# Patient Record
Sex: FEMALE | Race: WHITE | Employment: UNEMPLOYED | URBAN - METROPOLITAN AREA
[De-identification: names, ages, dates, MRNs, and addresses within clinical notes are randomized per-mention and may not be internally consistent; named-entity substitution may affect disease eponyms.]

---

## 2017-04-18 PROBLEM — E04.1 THYROID NODULE: Status: ACTIVE | Noted: 2017-04-18

## 2023-08-12 ENCOUNTER — HOSPITAL ENCOUNTER (EMERGENCY)
Facility: HOSPITAL | Age: 63
Discharge: HOME | End: 2023-08-12
Attending: EMERGENCY MEDICINE
Payer: COMMERCIAL

## 2023-08-12 ENCOUNTER — APPOINTMENT (OUTPATIENT)
Dept: ULTRASOUND IMAGING | Facility: HOSPITAL | Age: 63
End: 2023-08-12
Payer: COMMERCIAL

## 2023-08-12 VITALS
SYSTOLIC BLOOD PRESSURE: 145 MMHG | OXYGEN SATURATION: 97 % | DIASTOLIC BLOOD PRESSURE: 89 MMHG | HEART RATE: 70 BPM | RESPIRATION RATE: 14 BRPM | WEIGHT: 156 LBS | TEMPERATURE: 97 F

## 2023-08-12 DIAGNOSIS — R60.9 PERIPHERAL EDEMA: Primary | ICD-10-CM

## 2023-08-12 PROCEDURE — 99284 EMERGENCY DEPT VISIT MOD MDM: CPT

## 2023-08-12 PROCEDURE — 93971 EXTREMITY STUDY: CPT | Performed by: EMERGENCY MEDICINE

## 2023-08-12 NOTE — ED QUICK NOTES
Pt to triage desk inquiring why other patients were taking back before her. Explained to patient that rooming does not occur by first arrival but by acuity. Pt verbalizes understanding.

## 2023-09-19 PROBLEM — Z86.0100 PERSONAL HISTORY OF COLONIC POLYPS: Status: ACTIVE | Noted: 2023-09-19

## 2023-09-19 PROBLEM — D12.3 BENIGN NEOPLASM OF TRANSVERSE COLON: Status: ACTIVE | Noted: 2023-09-19

## 2024-10-02 ENCOUNTER — OFFICE VISIT (OUTPATIENT)
Dept: SURGERY | Facility: CLINIC | Age: 64
End: 2024-10-02
Payer: MEDICARE

## 2024-10-02 DIAGNOSIS — C44.529 SQUAMOUS CELL CARCINOMA OF SKIN OF CHEST: Primary | ICD-10-CM

## 2024-10-02 PROCEDURE — 99203 OFFICE O/P NEW LOW 30 MIN: CPT | Performed by: SURGERY

## 2024-10-02 NOTE — PATIENT INSTRUCTIONS
Surgeon:              Dr. Carol Aponte, PhD                                          Tel:         [phone]                                  Fax:        [phone]      Surgery/Procedure: Excision of squamous cell carcinoma mid chest left of center with intraoperative frozen sections, wound closure with local tissue rearrangement, possible grafts  1.5 hours, local anesthesia, outpatient, not October 23-27, 2024    Dx Code: [C44.529]     Hospital:  [facility]: [address] [place] [address]           [phone]  [facility]: [address], [place] [address]               [phone]    1. Someone will need to drive you to and from the hospital if your procedure is outpatient.    2.Do not drink alcohol or smoke 24 hours prior to your procedure.    3. Bring a picture ID and your insurance card.    4. You will be contacted by the hospital the day before to confirm the procedure time and location.     5. Do not take any herbal supplements or blood thinners at least one week before your procedure/surgery. This includes NSAID's (aspirin, baby aspirin, Motrin, Ibuprofen, Aleve, Advil, Naproxen, etc), fish oil, vitamin E, turmeric, CoQ10, or green tea supplements, etc. *TYLENOL or acetaminophen is ok to take*    6. Please inform us if you start or change any medications at least one week before surgery (ex: blood thinners, weight loss medications, diabetic medications, herbal supplements, etc)    8. Does patient have diagnosis of sleep apnea?    [   ] Yes     [  X ]  No    Consent obtained   Photos taken on 10/2/2024

## 2024-10-02 NOTE — CONSULTS
New Patient Consultation    Chief Complaint: squamous cell carcinoma mid chest     History of Present Illness:   Alisha Hillman is a 64 year old female referred by Dr. 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 This was diagnosed via shave biopsy on 9/10/2024.  She is here to discuss options for excision and reconstruction.     Past Medical History:      Past Medical History:    Anxiety    Arthritis    Constipation    Leaking of urine    Pain in joints    Thyroid disease         Past Surgical History:  Past Surgical History:   Procedure Laterality Date    Appendectomy           Medications:    No outpatient medications have been marked as taking for the 10/2/24 encounter (Appointment) with Carol Aponte MD.         Allergies:    Allergies   Allergen Reactions    Aspirin ANGIOEDEMA    Excedrin Extra Strength TONGUE SWELLING    Ibuprofen HIVES     Tongue Swelling    Sulfa Antibiotics      Patient unable to recall reaction.    Flagyl [Metronidazole] RASH         Family History:   Family History   Problem Relation Age of Onset    Ear Problems Father     Allergies Mother     Cancer Maternal Grandmother     Cancer Maternal Aunt     Bleeding Disorders Neg     Clotting Disorder Neg     Thyroid disease Neg     Anesthesia Problems  Neg          Social History:  History   Alcohol Use    7.0 standard drinks of alcohol/week    5 Glasses of wine, 2 Shots of liquor per week     Comment: \"social\"       History   Smoking Status    Never   Smokeless Tobacco    Never       History   Drug Use No       Review of Systems:    A 13 point review of systems was performed on the intake sheet.  The patient reports   General:   The patient denies, fever, chills, night sweats, fatigue, generalized weakness, change in appetite, weight loss, or +weight gain.  Endocrine:   There is no history of poor/slow wound healing, +weight loss/gain,  fertility or hormone problems, cold intolerance, heat intolerance, excessive thirst, or +thyroid disease.   Allergic/Immunologic:  There is no history of hives, hay fever, angioedema or anaphylaxis.  HEENT:    The patient denies ear pain, ear drainage, hearing loss, change in vision, double vision, cataracts, glaucoma, nasal congestion, nosebleed, hoarseness, sore throat, or swollen glands  Respiratory:   The patient denies shortness of breath, cough, bloody cough, phlegm, asthma, or wheezing  Cardiovascular:  The patient denies chest pain/pressure, palpitations, irregular heartbeat, high blood pressure, stroke, or leg swelling  Breasts:  Patient denies breast masses, pain, change in the breast skin, skin dimpling, nipple discharge, or rash  Gastrointestinal:   There is no history of difficulty or pain with swallowing, reflux symptoms, nausea, vomiting, dark/ bloody stools, diarrhea, +constipation,  change in bowel habits, or abdominal pain.   Genitourinary:  The patient denies frequent urination, +needing to get up at night to urinate, urinary hesitancy or retaining urine, painful urination, urinary incontinence, decreased urine stream, blood in the urine, or vaginal/penile discharge.  Skin:   The patient denies rash, itching, skin lesions, dry skin, change in skin color or change in moles, sunburns, or sunburns with blistering.   Hematologic/Lymphatic:  The patient denies easily bruising or bleeding, persistent swollen glands or lymph nodes, bleeding disorders, blood clots, or pulmonary embolism.   Gynecologic:  The patient denies irregular menses, pelvic pain, pain with intercourse, painful menses, or pregnancy  Musculoskeletal:  The patient denies +muscle aches/pain, +joint pain, +stiff joints, neck pain, back pain or bone pain.  Neurologic:  There is no history of migraines or severe headaches, seizure/epilepsy, speech problems, coordination problems, trembling/tremors, fainting/black outs, dizziness, memory  problems, loss of sensation/numbness, problems walking, weakness, +tingling or burning in hands/feet.   Psychiatric:  There is no history of abusive relationship, bipolar disorder, sleep disturbance, anxiety, depression or feeling of despair.    Physical Exam:    There were no vitals taken for this visit.    Constitutional: The patient is an alert, oriented and well-developed.     Neurologic: Speech patterns and movements are normal.     Psychiatric: Affect is appropriate.    Eyes: Conjunctiva are clear, non-icteric. PERRL    ENT: no obvious abnormality, no ear drainage, mucous membranes moist and pink    Integument/Skin: left mid chest with well healed biopsy site    Respiratory: Normal respiratory effort.     Cardiovascular: no cyanosis, no edema    Musculoskeletal: Extremities unremarkable, without edema, tenderness or deformities    Impression:   Alisha Hillman  is a 64 year old with focally invasive well differentiated squamous cell carcinoma mid chest left of [place]    Discussion and Plan:  The patient was counseled on the different treatment options.     We discussed the option for excision of squamous cell carcinoma mid chest left [facility] with intraoperative frozen sections, wound closure with local tissue rearrangement, possible grafts. We discussed that the size of the defect will be determined following excision with intraoperative frozen sections. This will be done under local anesthesia. We discussed the possible need for further treatment pending final pathology.     The different treatment options were discussed with the patient.  The procedures and the postoperative care was discussed in detail.  Potential risks complications benefits and alternatives were discussed.  Risks and complications including but not limited to infection, bleeding, scarring, damage to surrounding structures, such as nerves, blood vessels, muscles,  tendons, risk of hematoma, seroma, foreign body reaction, allergic  reaction, wound dehiscences, delayed wound healing, graft failure, flap failure, need for further surgery.    Patient understands and wishes to proceed with the procedure, questions were answered.    45 minutes were spent with the patient, from which 35 minutes were spent counseling the patient and coordinating care.

## 2024-10-02 NOTE — H&P (VIEW-ONLY)
New Patient Consultation    Chief Complaint: squamous cell carcinoma mid chest     History of Present Illness:   Alisha Hillman is a 64 year old female referred by Dr. 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 This was diagnosed via shave biopsy on 9/10/2024.  She is here to discuss options for excision and reconstruction.     Past Medical History:      Past Medical History:    Anxiety    Arthritis    Constipation    Leaking of urine    Pain in joints    Thyroid disease         Past Surgical History:  Past Surgical History:   Procedure Laterality Date    Appendectomy           Medications:    No outpatient medications have been marked as taking for the 10/2/24 encounter (Appointment) with Carol Aponte MD.         Allergies:    Allergies   Allergen Reactions    Aspirin ANGIOEDEMA    Excedrin Extra Strength TONGUE SWELLING    Ibuprofen HIVES     Tongue Swelling    Sulfa Antibiotics      Patient unable to recall reaction.    Flagyl [Metronidazole] RASH         Family History:   Family History   Problem Relation Age of Onset    Ear Problems Father     Allergies Mother     Cancer Maternal Grandmother     Cancer Maternal Aunt     Bleeding Disorders Neg     Clotting Disorder Neg     Thyroid disease Neg     Anesthesia Problems  Neg          Social History:  History   Alcohol Use    7.0 standard drinks of alcohol/week    5 Glasses of wine, 2 Shots of liquor per week     Comment: \"social\"       History   Smoking Status    Never   Smokeless Tobacco    Never       History   Drug Use No       Review of Systems:    A 13 point review of systems was performed on the intake sheet.  The patient reports   General:   The patient denies, fever, chills, night sweats, fatigue, generalized weakness, change in appetite, weight loss, or +weight gain.  Endocrine:   There is no history of poor/slow wound healing, +weight loss/gain,  fertility or hormone problems, cold intolerance, heat intolerance, excessive thirst, or +thyroid disease.   Allergic/Immunologic:  There is no history of hives, hay fever, angioedema or anaphylaxis.  HEENT:    The patient denies ear pain, ear drainage, hearing loss, change in vision, double vision, cataracts, glaucoma, nasal congestion, nosebleed, hoarseness, sore throat, or swollen glands  Respiratory:   The patient denies shortness of breath, cough, bloody cough, phlegm, asthma, or wheezing  Cardiovascular:  The patient denies chest pain/pressure, palpitations, irregular heartbeat, high blood pressure, stroke, or leg swelling  Breasts:  Patient denies breast masses, pain, change in the breast skin, skin dimpling, nipple discharge, or rash  Gastrointestinal:   There is no history of difficulty or pain with swallowing, reflux symptoms, nausea, vomiting, dark/ bloody stools, diarrhea, +constipation,  change in bowel habits, or abdominal pain.   Genitourinary:  The patient denies frequent urination, +needing to get up at night to urinate, urinary hesitancy or retaining urine, painful urination, urinary incontinence, decreased urine stream, blood in the urine, or vaginal/penile discharge.  Skin:   The patient denies rash, itching, skin lesions, dry skin, change in skin color or change in moles, sunburns, or sunburns with blistering.   Hematologic/Lymphatic:  The patient denies easily bruising or bleeding, persistent swollen glands or lymph nodes, bleeding disorders, blood clots, or pulmonary embolism.   Gynecologic:  The patient denies irregular menses, pelvic pain, pain with intercourse, painful menses, or pregnancy  Musculoskeletal:  The patient denies +muscle aches/pain, +joint pain, +stiff joints, neck pain, back pain or bone pain.  Neurologic:  There is no history of migraines or severe headaches, seizure/epilepsy, speech problems, coordination problems, trembling/tremors, fainting/black outs, dizziness, memory  problems, loss of sensation/numbness, problems walking, weakness, +tingling or burning in hands/feet.   Psychiatric:  There is no history of abusive relationship, bipolar disorder, sleep disturbance, anxiety, depression or feeling of despair.    Physical Exam:    There were no vitals taken for this visit.    Constitutional: The patient is an alert, oriented and well-developed.     Neurologic: Speech patterns and movements are normal.     Psychiatric: Affect is appropriate.    Eyes: Conjunctiva are clear, non-icteric. PERRL    ENT: no obvious abnormality, no ear drainage, mucous membranes moist and pink    Integument/Skin: left mid chest with well healed biopsy site    Respiratory: Normal respiratory effort.     Cardiovascular: no cyanosis, no edema    Musculoskeletal: Extremities unremarkable, without edema, tenderness or deformities    Impression:   Alisha Hillman  is a 64 year old with focally invasive well differentiated squamous cell carcinoma mid chest left of [place]    Discussion and Plan:  The patient was counseled on the different treatment options.     We discussed the option for excision of squamous cell carcinoma mid chest left [facility] with intraoperative frozen sections, wound closure with local tissue rearrangement, possible grafts. We discussed that the size of the defect will be determined following excision with intraoperative frozen sections. This will be done under local anesthesia. We discussed the possible need for further treatment pending final pathology.     The different treatment options were discussed with the patient.  The procedures and the postoperative care was discussed in detail.  Potential risks complications benefits and alternatives were discussed.  Risks and complications including but not limited to infection, bleeding, scarring, damage to surrounding structures, such as nerves, blood vessels, muscles,  tendons, risk of hematoma, seroma, foreign body reaction, allergic  reaction, wound dehiscences, delayed wound healing, graft failure, flap failure, need for further surgery.    Patient understands and wishes to proceed with the procedure, questions were answered.    45 minutes were spent with the patient, from which 35 minutes were spent counseling the patient and coordinating care.

## 2024-10-03 ENCOUNTER — TELEPHONE (OUTPATIENT)
Dept: SURGERY | Facility: CLINIC | Age: 64
End: 2024-10-03

## 2024-10-03 NOTE — TELEPHONE ENCOUNTER
AFSHAN for patient asking them to please give me a call back regarding scheduling their surgery with Dr. Aponte

## 2024-10-04 ENCOUNTER — TELEPHONE (OUTPATIENT)
Dept: SURGERY | Facility: CLINIC | Age: 64
End: 2024-10-04

## 2024-10-04 DIAGNOSIS — C44.529 SQUAMOUS CELL CARCINOMA OF SKIN OF CHEST: Primary | ICD-10-CM

## 2024-10-04 RX ORDER — THYROID 15 MG/1
TABLET ORAL DAILY
COMMUNITY

## 2024-10-04 NOTE — TELEPHONE ENCOUNTER
Called pt returning VM in regards to upcoming surgery. Confirmed with patient she does NOT need an EKG or any lab work as she is having her procedure under local anesthesia. Instructed patient to touch base with PCP in regards to current medications and ask if any of them need to be held prior to the surgery. Patient verbalized understanding and is reaching out to her PCP.

## 2024-10-15 ENCOUNTER — HOSPITAL ENCOUNTER (OUTPATIENT)
Facility: HOSPITAL | Age: 64
Setting detail: HOSPITAL OUTPATIENT SURGERY
Discharge: HOME | End: 2024-10-15
Attending: SURGERY | Admitting: SURGERY
Payer: MEDICARE

## 2024-10-15 VITALS
HEART RATE: 77 BPM | BODY MASS INDEX: 25.26 KG/M2 | SYSTOLIC BLOOD PRESSURE: 132 MMHG | RESPIRATION RATE: 16 BRPM | TEMPERATURE: 97 F | HEIGHT: 67 IN | WEIGHT: 160.94 LBS | OXYGEN SATURATION: 98 % | DIASTOLIC BLOOD PRESSURE: 99 MMHG

## 2024-10-15 DIAGNOSIS — C44.529 SQUAMOUS CELL CARCINOMA OF SKIN OF CHEST: ICD-10-CM

## 2024-10-15 PROCEDURE — 88331 PATH CONSLTJ SURG 1 BLK 1SPC: CPT | Performed by: SURGERY

## 2024-10-15 PROCEDURE — 0JR637Z REPLACEMENT OF CHEST SUBCUTANEOUS TISSUE AND FASCIA WITH AUTOLOGOUS TISSUE SUBSTITUTE, PERCUTANEOUS APPROACH: ICD-10-PCS | Performed by: SURGERY

## 2024-10-15 PROCEDURE — 0HB5XZZ EXCISION OF CHEST SKIN, EXTERNAL APPROACH: ICD-10-PCS | Performed by: SURGERY

## 2024-10-15 PROCEDURE — 88332 PATH CONSLTJ SURG EA ADD BLK: CPT | Performed by: SURGERY

## 2024-10-15 PROCEDURE — 88305 TISSUE EXAM BY PATHOLOGIST: CPT | Performed by: SURGERY

## 2024-10-15 RX ORDER — LIDOCAINE HYDROCHLORIDE AND EPINEPHRINE 10; 10 MG/ML; UG/ML
INJECTION, SOLUTION INFILTRATION; PERINEURAL AS NEEDED
Status: DISCONTINUED | OUTPATIENT
Start: 2024-10-15 | End: 2024-10-15

## 2024-10-15 NOTE — DISCHARGE INSTRUCTIONS
Plastic Surgery Home Care Instructions      We hope you were pleased with your care at [facility].  We wish you the best outcome and overall experience with your operation.  These instructions will help to minimize pain, optimize healing, and improve the likelihood of a successful result.    What To Expect  There may be some spotting of the incision lines for the next few days.  Mild bruising, mild swelling and discomfort in the surgical area is typical.     Bandages (Dressing)  Leave steri-strips in place. If these fall off on their own, that is ok. Apply antibiotic ointment (neosporin, bacitracin, etc) daily if the steri-strips fall off.    Bathing/Showers  You can resume showering tomorrow. Let soap and water run over the incision, don't scrub.   No baths, swimming, or hot tubs until you receive medical permission    Over-The-Counter Medication  You can take Tylenol after surgery for pain relief as needed  Take as directed on the bottle    Diet  Resume your normal diet    Activity  No strenuous activity     Follow-up Appointment   Our office will call you to set up a time    Questions or Concerns  Call Dr. Aponte's office if you experience bleeding that is not controlled by holding pressure for 20 minutes.     Alisha   Thank you for coming to [facility] for your operation.  The nurses and the anesthesiologist try very hard to make sure you receive the best care possible.  Your trust in them is greatly appreciated.    Thanks so much,  Dr. Fay Mane PA-C

## 2024-10-15 NOTE — BRIEF OP NOTE
Pre-Operative Diagnosis: Squamous cell carcinoma of skin of chest [C44.529]     Post-Operative Diagnosis: Squamous cell carcinoma of skin of chest [C44.529]      Procedure Performed:   Excision of squamous cell carcinoma mid chest left of center with intraoperative frozen section, complex wound closure with dermal fat graft    Surgeons and Role:     * Carol Aponte MD - Primary    Assistant(s):  PA: Linh Mane PA     Surgical Findings: see dictation     Specimen: see pathology     Estimated Blood Loss: Blood Output: 1 mL (10/15/2024  2:16 PM)    VICTOR M Snyder  10/15/2024  2:22 PM

## 2024-10-16 NOTE — OPERATIVE REPORT
[facility]    PATIENT'S NAME: BOLIVAR BELLO   ATTENDING PHYSICIAN: Carol Aponte MD   OPERATING PHYSICIAN: Carol Aponte MD   PATIENT ACCOUNT#:   [id number]    LOCATION:  [facility]  MEDICAL RECORD #:   [id number]       YOB: 1960  ADMISSION DATE:       10/15/2024      OPERATION DATE:  10/15/2024    OPERATIVE REPORT    PREOPERATIVE DIAGNOSIS:  Squamous cell cancer, anterior chest.  POSTOPERATIVE DIAGNOSIS:  Squamous cell cancer, anterior chest.  PROCEDURE:  Excision of squamous cell cancer, anterior chest; intraoperative frozen section, 1.2 x 0.8 cm; complex layered closure, anterior chest 1.3 cm; dermal fat graft.    ASSISTANT:  Linh Mane PA-C.    ANESTHESIA:  Local anesthesia.    COMPLICATIONS:  None.    BLOOD LOSS:  Minimal.    INDICATIONS:  This is a 64-year-old female with a squamous cell cancer on her anterior chest for which she was seen in the office to discuss surgical excision with intraoperative frozen section.  We discussed the different options and she wished to proceed with the procedure.    OPERATIVE TECHNIQUE:  Patient was identified in the preoperative area, informed consent was obtained, the site was marked.  Patient was then brought back to the operating room and placed in supine position.  She was adequately padded, and sequential compression devices were applied.  Then, 1% lidocaine with epinephrine was injected surrounding the squamous cell carcinoma site.  Then, 1 mm margin was marked.  Then, the area was prepped and draped sterilely.  Then, a 15 blade was used to excise the squamous cell cancer.  It was marked at the 12 o'clock superior aspect with a marking stitch and was sent to Pathology for frozen section analysis.  The margins came back negative, and then a complex layered closure was performed by undermining the sites of the wound using electrocautery, and then a small dermal fat graft from the excess dog ear area on the superior  aspect was used to fill in the deficiency in the central area of the wound.  Then, this was all closed using 4-0 Vicryl sutures for the deep dermal layer, followed by 5-0 Prolene sutures for the skin.  Additional excess skin on the superior and inferior was excised and sent for permanent evaluation.  Steri-Strips were applied.  The patient tolerated the procedure well and was brought to Recovery in stable condition.  All sponge and needle counts were correct at the end of the case.    Dictated By Carol Aponte MD  d: 10/15/2024 16:33:34  t: 10/15/2024 23:16:52  [facility] [id number]  [facility]/

## 2024-10-22 ENCOUNTER — NURSE ONLY (OUTPATIENT)
Dept: SURGERY | Facility: CLINIC | Age: 64
End: 2024-10-22
Payer: MEDICARE

## 2024-10-22 DIAGNOSIS — C44.529 SQUAMOUS CELL CARCINOMA OF SKIN OF CHEST: Primary | ICD-10-CM

## 2024-11-05 NOTE — PROGRESS NOTES
Alisha Hillman is a 64 year old female who presents today for a follow-up after excision of squamous cell cancer, anterior chest, intraoperative frozen section, 1.2 x 0.8 cm, complex layered closure, anterior chest 1.3 cm, dermal fat graft on 10/15/2024.     She denies fever and chills. She denies nausea, vomiting, diarrhea or constipation.   Her pain is controlled.  She is here today for wound re-check.    Pathology from 10/15/2024:  Final Diagnosis:   A.  Skin, chest, excision:  -There is no residuum of the previously diagnosed squamous cell carcinoma.  -Reparative changes consistent with previous operative site.     B.  Skin, chest, superior 11:00-1:00, excision:  -Benign skin.  -Negative for carcinoma.     C.  Skin, chest, inferior 5:00-7:00, excision:  -Benign skin.  -Negative for carcinoma.       Physical Exam     Anterior chest incision is clean, dry, and intact. No wound drainage or wound dehiscence. No erythema or ecchymosis.     There were no vitals filed for this visit.      Assessment and Plan     Alisha Hillman is doing well s/p excision of squamous cell cancer, anterior chest, intraoperative frozen section, 1.2 x 0.8 cm, complex layered closure, anterior chest 1.3 cm, dermal fat graft on 10/15/2024.     Her incision is well healed. We reviewed options for scar management including vitamin E oil, silicone products, scar massage and sun protection/sun block.     She will follow up as needed. She was encouraged to contact our office with any questions or concerns.     Questions were answered. Patient understands.

## 2024-11-06 ENCOUNTER — OFFICE VISIT (OUTPATIENT)
Dept: SURGERY | Facility: CLINIC | Age: 64
End: 2024-11-06
Payer: MEDICARE

## 2024-11-06 DIAGNOSIS — C44.529 SQUAMOUS CELL CARCINOMA OF SKIN OF CHEST: Primary | ICD-10-CM

## 2024-11-06 PROCEDURE — 99024 POSTOP FOLLOW-UP VISIT: CPT | Performed by: SURGERY

## (undated) DEVICE — SOLUTION IRRIG 1000ML 0.9% NACL USP BTL

## (undated) DEVICE — GLOVE SUR 6.5 SENSICARE PI PIP CRM PWD F

## (undated) DEVICE — GLOVE SUR 7 SENSICARE PI PIP GRN PWD F

## (undated) DEVICE — SHEET,DRAPE,40X58,STERILE: Brand: MEDLINE

## (undated) DEVICE — PREMIUM WET SKIN PREP TRAY: Brand: MEDLINE INDUSTRIES, INC.

## (undated) DEVICE — MARKER SKIN PREP-RESISTANT ST: Brand: MEDLINE INDUSTRIES, INC.

## (undated) DEVICE — SYRINGE MED 10ML LL TIP W/O SFTY DISP

## (undated) DEVICE — PROXIMATE SKIN STAPLERS (35 WIDE) CONTAINS 35 STAINLESS STEEL STAPLES (FIXED HEAD): Brand: PROXIMATE

## (undated) DEVICE — SUT COAT VCRL 4-0 27IN RB-1 ABSRB VLT 17MM 1/

## (undated) DEVICE — #15 STERILE STAINLESS BLADE: Brand: STERILE STAINLESS BLADES

## (undated) DEVICE — PAD,NON-ADHERENT,3X8,STERILE,LF,1/PK: Brand: MEDLINE

## (undated) DEVICE — CABLE BPLR L12FT FLYING LD DISP

## (undated) DEVICE — CONTAINER,SPECIMEN,PNEU TUBE,4OZ,OR STRL: Brand: MEDLINE

## (undated) DEVICE — ELECTRODE ES 2.75IN PTFE BLDE MOD E-Z CLN

## (undated) DEVICE — PACK DRAPE HEAD/NECK STER

## (undated) DEVICE — MT SPANDAGE TUBULAR ELASTIC RETAINER NET - SIZE 10 - 25 YDS (STRETCHED): Brand: MT SPANDAGE

## (undated) DEVICE — SHEET, DRAPE, SPLIT, STERILE: Brand: MEDLINE

## (undated) DEVICE — COVER,LIGHT,CAMERA,HARD,1/PK,STRL: Brand: MEDLINE

## (undated) DEVICE — STANDARD HYPODERMIC NEEDLE,POLYPROPYLENE HUB: Brand: MONOJECT